# Patient Record
Sex: MALE | Race: WHITE | NOT HISPANIC OR LATINO | Employment: OTHER | URBAN - METROPOLITAN AREA
[De-identification: names, ages, dates, MRNs, and addresses within clinical notes are randomized per-mention and may not be internally consistent; named-entity substitution may affect disease eponyms.]

---

## 2017-01-10 ENCOUNTER — TELEPHONE (OUTPATIENT)
Dept: PULMONOLOGY | Facility: HOSPICE | Age: 67
End: 2017-01-10

## 2017-01-10 DIAGNOSIS — G47.33 OBSTRUCTIVE SLEEP APNEA: ICD-10-CM

## 2018-02-12 ENCOUNTER — HOSPITAL ENCOUNTER (EMERGENCY)
Facility: MEDICAL CENTER | Age: 68
End: 2018-02-12
Attending: EMERGENCY MEDICINE
Payer: MEDICARE

## 2018-02-12 VITALS
OXYGEN SATURATION: 95 % | BODY MASS INDEX: 37.95 KG/M2 | TEMPERATURE: 98.8 F | SYSTOLIC BLOOD PRESSURE: 146 MMHG | HEART RATE: 70 BPM | WEIGHT: 280.2 LBS | RESPIRATION RATE: 18 BRPM | HEIGHT: 72 IN | DIASTOLIC BLOOD PRESSURE: 86 MMHG

## 2018-02-12 DIAGNOSIS — B02.9 HERPES ZOSTER WITHOUT COMPLICATION: ICD-10-CM

## 2018-02-12 LAB — EKG IMPRESSION: NORMAL

## 2018-02-12 PROCEDURE — 93005 ELECTROCARDIOGRAM TRACING: CPT

## 2018-02-12 PROCEDURE — 93005 ELECTROCARDIOGRAM TRACING: CPT | Performed by: EMERGENCY MEDICINE

## 2018-02-12 PROCEDURE — 99284 EMERGENCY DEPT VISIT MOD MDM: CPT

## 2018-02-12 RX ORDER — HYDROCODONE BITARTRATE AND ACETAMINOPHEN 5; 325 MG/1; MG/1
1-2 TABLET ORAL EVERY 4 HOURS PRN
Qty: 15 TAB | Refills: 0 | Status: SHIPPED | OUTPATIENT
Start: 2018-02-12 | End: 2018-02-17

## 2018-02-12 RX ORDER — VALACYCLOVIR HYDROCHLORIDE 1 G/1
1000 TABLET, FILM COATED ORAL 2 TIMES DAILY
Qty: 14 TAB | Refills: 0 | Status: SHIPPED | OUTPATIENT
Start: 2018-02-12 | End: 2018-02-19

## 2018-02-12 RX ORDER — PREDNISONE 20 MG/1
20 TABLET ORAL 2 TIMES DAILY
Qty: 10 TAB | Refills: 0 | Status: SHIPPED | OUTPATIENT
Start: 2018-02-12

## 2018-02-12 ASSESSMENT — PAIN SCALES - GENERAL
PAINLEVEL_OUTOF10: 10
PAINLEVEL_OUTOF10: 5

## 2018-02-12 ASSESSMENT — LIFESTYLE VARIABLES: DO YOU DRINK ALCOHOL: NO

## 2018-02-12 NOTE — DISCHARGE INSTRUCTIONS
Shingles  Shingles, which is also known as herpes zoster, is an infection that causes a painful skin rash and fluid-filled blisters. Shingles is not related to genital herpes, which is a sexually transmitted infection.     Shingles only develops in people who:  · Have had chickenpox.  · Have received the chickenpox vaccine. (This is rare.)  CAUSES  Shingles is caused by varicella-zoster virus (VZV). This is the same virus that causes chickenpox. After exposure to VZV, the virus stays in the body in an inactive (dormant) state. Shingles develops if the virus reactivates. This can happen many years after the initial exposure to VZV. It is not known what causes this virus to reactivate.  RISK FACTORS  People who have had chickenpox or received the chickenpox vaccine are at risk for shingles. Infection is more common in people who:  · Are older than age 50.  · Have a weakened defense (immune) system, such as those with HIV, AIDS, or cancer.  · Are taking medicines that weaken the immune system, such as transplant medicines.  · Are under great stress.  SYMPTOMS  Early symptoms of this condition include itching, tingling, and pain in an area on your skin. Pain may be described as burning, stabbing, or throbbing.  A few days or weeks after symptoms start, a painful red rash appears, usually on one side of the body in a bandlike or beltlike pattern. The rash eventually turns into fluid-filled blisters that break open, scab over, and dry up in about 2-3 weeks.  At any time during the infection, you may also develop:  · A fever.  · Chills.  · A headache.  · An upset stomach.  DIAGNOSIS  This condition is diagnosed with a skin exam. Sometimes, skin or fluid samples are taken from the blisters before a diagnosis is made. These samples are examined under a microscope or sent to a lab for testing.  TREATMENT  There is no specific cure for this condition. Your health care provider will probably prescribe medicines to help you  manage pain, recover more quickly, and avoid long-term problems. Medicines may include:  · Antiviral drugs.  · Anti-inflammatory drugs.  · Pain medicines.  If the area involved is on your face, you may be referred to a specialist, such as an eye doctor (ophthalmologist) or an ear, nose, and throat (ENT) doctor to help you avoid eye problems, chronic pain, or disability.  HOME CARE INSTRUCTIONS  Medicines  · Take medicines only as directed by your health care provider.  · Apply an anti-itch or numbing cream to the affected area as directed by your health care provider.  Blister and Rash Care  · Take a cool bath or apply cool compresses to the area of the rash or blisters as directed by your health care provider. This may help with pain and itching.  · Keep your rash covered with a loose bandage (dressing). Wear loose-fitting clothing to help ease the pain of material rubbing against the rash.  · Keep your rash and blisters clean with mild soap and cool water or as directed by your health care provider.  · Check your rash every day for signs of infection. These include redness, swelling, and pain that lasts or increases.  · Do not pick your blisters.  · Do not scratch your rash.  General Instructions  · Rest as directed by your health care provider.  · Keep all follow-up visits as directed by your health care provider. This is important.  · Until your blisters scab over, your infection can cause chickenpox in people who have never had it or been vaccinated against it. To prevent this from happening, avoid contact with other people, especially:  ¨ Babies.  ¨ Pregnant women.  ¨ Children who have eczema.  ¨ Elderly people who have transplants.  ¨ People who have chronic illnesses, such as leukemia or AIDS.  SEEK MEDICAL CARE IF:  · Your pain is not relieved with prescribed medicines.  · Your pain does not get better after the rash heals.  · Your rash looks infected. Signs of infection include redness, swelling, and pain  that lasts or increases.  SEEK IMMEDIATE MEDICAL CARE IF:  · The rash is on your face or nose.  · You have facial pain, pain around your eye area, or loss of feeling on one side of your face.  · You have ear pain or you have ringing in your ear.  · You have loss of taste.  · Your condition gets worse.     This information is not intended to replace advice given to you by your health care provider. Make sure you discuss any questions you have with your health care provider.     Document Released: 12/18/2006 Document Revised: 01/08/2016 Document Reviewed: 10/29/2015  [employer] Patient Education ©2016 [employer]

## 2018-02-12 NOTE — ED TRIAGE NOTES
Pt ambulated to triage with   Chief Complaint   Patient presents with   • Sent by MD     thought he was having allergic reaction to fluoxetine, with shooting pain to head and rash to face, and trouble breathing, congested.  - told by pharmacist to not take that medication any more, called PCP and told him to come to the ER.       Pt stopped taking medication, last dose Saturday morning.  Pt reports chest pressure, called for EKG.  Pt reports son works on [employer].  Pt Informed regarding triage process and verbalized understanding to inform triage tech or RN for any changes in condition. Placed in lobby.

## 2018-02-12 NOTE — ED PROVIDER NOTES
"ED Provider Note    Scribed for Caleb Marquez M.D. by Carolann Harper. 2/12/2018  2:03 PM    Primary care provider: Caleb Ortiz M.D.  Means of arrival: Walk-in  History obtained from: Patient  History limited by: None    CHIEF COMPLAINT  Chief Complaint   Patient presents with   • Sent by MD     thought he was having allergic reaction to fluoxetine, with shooting pain to head and rash to face, and trouble breathing, congested.  - told by pharmacist to not take that medication any more, called PCP and told him to come to the ER.       HPI  Ryan Rodgers is a 67 y.o. male who presents to the Emergency Department for rash onset two weeks ago. Patient was recently placed on Prozac by his primary care physician and he began developing symptoms he suspected are secondary to an allergic reaction to the new medication. He last took a dose of Prozac two days ago. Patient complains of a \"burning\" rash which presents to the top of his head, scalp and right side of his face. Other symptoms include intermittent headaches and inability to sleep. Patient states he consulted with the pharmacist who filled his prescription and the pharmacist agreed his symptoms may be due to a possible allergic reaction and told him to contact his physician. Patient then contacted his primary care provider who advised him to come to the ED. Patient denies recent fever.     Patient also complained of chest \"pressure\" upon arrival but states this has currently resolved.    REVIEW OF SYSTEMS  Pertinent positives include rash, headaches, inability to sleep. Pertinent negatives include no fever. As above, all other systems reviewed and are negative.  See HPI for further details.   C.    PAST MEDICAL HISTORY   has a past medical history of Central sleep apnea (1/30/2012); Chickenpox; Dyslipidemia; Hepatitis C (10/28/2010); Hepatitis C; HTN (hypertension) (10/28/2010); Hyperlipidemia (10/28/2010); Impaired fasting blood sugar (10/28/2010); " Renal insufficiency (10/28/2010); and Sleep apnea.    SURGICAL HISTORY   has a past surgical history that includes laminotomy; elbow arthroscopy; and cervical fusion posterior.    SOCIAL HISTORY  Social History   Substance Use Topics   • Smoking status: Former Smoker     Packs/day: 0.50     Years: 30.00     Types: Cigarettes     Quit date: 1/1/2000   • Smokeless tobacco: Never Used      Comment: quit 1998   • Alcohol use No      History   Drug Use No       FAMILY HISTORY  Family History   Problem Relation Age of Onset   • Cancer Mother    • Sleep Apnea Neg Hx        CURRENT MEDICATIONS    Current Outpatient Prescriptions on File Prior to Encounter   Medication Sig Dispense Refill   • levothyroxine (SYNTHROID) 50 MCG Tab      • nabumetone (RELAFEN) 750 MG Tab      • GAVILYTE-N WITH FLAVOR PACK 420 G solution      • pravastatin (PRAVACHOL) 20 MG Tab      • hydrochlorothiazide (HYDRODIURIL) 25 MG TABS Take 1 Tab by mouth every day. 30 Each 3   • citalopram (CELEXA) 20 MG TABS Take 1 Tab by mouth every day. 30 Each 3   • losartan (COZAAR) 100 MG TABS Take 1 Tab by mouth every day. 30 Each 3   • amlodipine (NORVASC) 5 MG TABS Take 1 Tab by mouth every day. 30 Each 3   • zolpidem (AMBIEN) 5 MG TABS Take 5 mg by mouth at bedtime as needed.     • albuterol (VENTOLIN OR PROVENTIL) 108 (90 BASE) MCG/ACT AERS Inhale 2 Puffs by mouth every 6 hours as needed for Shortness of Breath. 1 Inhaler 2    Prozac.     ALLERGIES  Allergies   Allergen Reactions   • Celebrex [Celecoxib]    • Vioxx    • Zestril [Lisinopril]      Rash         PHYSICAL EXAM  VITAL SIGNS: /90   Pulse 82   Temp 37.1 °C (98.8 °F) (Temporal)   Resp 16   Ht 1.829 m (6')   Wt (!) 127.1 kg (280 lb 3.3 oz)   SpO2 95%   BMI 38.00 kg/m²     Constitutional: Well developed, Well nourished,no acute distress, Non-toxic appearance.   HENT: Normocephalic, Atraumatic. Oropharynx moist. See skin exam below.  Eyes: PERRL, EOMI, Conjunctiva normal, No discharge. No  apparent involvement  Neck: no anterior cervical lymphadenopathy  CV: Good pulses  Thorax & Lungs: No respiratory distress.   Abdomen:  Soft, non-tender.  No rebound, no peritoneal signs.   Skin: Redness to right side of forehead and right cheek. One vesicular patch on right side of forehead consistent with Shingles. Warm, Dry.   Musculoskeletal: No major deformities noted.   Neurologic: Awake, alert. Moves all extremities spontaneously.  Psychiatric: Affect normal, Mood normal.     LABS  Results for orders placed or performed during the hospital encounter of 18   EKG (ER)   Result Value Ref Range    Report       [facility] Emergency Dept.    Test Date:  2018  Pt Name:    SABAS WEINBERG           Department: ER  MRN:        [id number]                      Room:  Gender:     Male                         Technician: [id number]  :        1950                   Requested By:ER TRIAGE PROTOCOL  Order #:    [id number]                    Reading MD: MELLISSA DE LA VEGA MD    Measurements  Intervals                                Axis  Rate:       72                           P:          25  OH:         184                          QRS:        30  QRSD:       98                           T:          8  QT:         420  QTc:        460    Interpretive Statements  SINUS RHYTHM  No previous ECG available for comparison    Electronically Signed On 2018 14:16:30 PST by MELLISSA DE LA VEGA MD        EKG  Interpreted by me. See labs above.     RADIOLOGY  None.    COURSE & MEDICAL DECISION MAKING  Nursing notes, VS, PMSFHx reviewed in chart.    2:03 PM - Patient seen and examined at bedside. I informed the patient his symptoms and presentation are consistent with Shingles. I also informed the patient his symptoms may persist for 4-6 weeks. I advised the patient to monitor his symptoms for any changes and told him in some rare cases, Shingles may present longer that 4-6 weeks. We discussed follow-up  with his primary care physician. I explained the discharge medications I will prescribe with the patient. He was agreeable and had the opportunity for questions.     2:18 PM Interpreted EKG. See labs above.      The patient will return for new or worsening symptoms and is stable at the time of discharge.    In prescribing controlled substances to this patient, I certify that I have obtained and reviewed the medical history of Ryan Rodgers. I have also made a good raulito effort to obtain applicable records from other providers who have treated the patient and reviewed BNP.     I have conducted a physical exam and documented it. I have reviewed Mr. Rodgers’s prescription history as maintained by the [place] Prescription Monitoring Program.     I have assessed the patient’s risk for abuse, dependency, and addiction using the validated Opioid Risk Tool available at https://www.mdcRewardMyWay.com/ctbomz-qhto-nqat-ort-narcotic-abuse.     Given the above, I believe the benefits of controlled substance therapy outweigh the risks. The reasons for prescribing controlled substances include  neuropathic pain from shingles Accordingly, I have discussed the risk and benefits, treatment plan, and alternative therapies with the patient.        DISPOSITION:  Patient will be discharged home in stable condition.    FOLLOW UP:  Primary Care Provider.     OUTPATIENT MEDICATIONS:  New Prescriptions    HYDROCODONE-ACETAMINOPHEN (NORCO) 5-325 MG TAB PER TABLET    Take 1-2 Tabs by mouth every four hours as needed for up to 5 days.    PREDNISONE (DELTASONE) 20 MG TAB    Take 1 Tab by mouth 2 times a day.    VALACYCLOVIR (VALTREX) 1 GM TAB    Take 1 Tab by mouth 2 times a day for 7 days.       FINAL IMPRESSION  1. Herpes zoster without complication          ICarolann (Che), am scribing for, and in the presence of, Caleb Marquez M.D..    Electronically signed by: Carolann Harper (Che), 2/12/2018    Caleb PARIKH M.D.  personally performed the services described in this documentation, as scribed by Carolann Harper in my presence, and it is both accurate and complete.    The note accurately reflects work and decisions made by me.  Caleb Marquez  2/12/2018  2:55 PM

## 2018-02-12 NOTE — ED NOTES
Discharge instructions given, pt verbalized understanding.  Understands NOT to drive or drink alcohol while taking narcotics.  Prescription instructions given, pt verbalized understanding.  Pt signed narcotic form.  A&ox4.  VSS.  Ambulates out of ER.

## 2021-03-03 DIAGNOSIS — Z23 NEED FOR VACCINATION: ICD-10-CM

## 2023-03-31 LAB
ALBUMIN SERPL-MCNC: 4.6 G/DL (ref 3.8–4.8)
ALBUMIN/GLOB SERPL: 1.4 {RATIO} (ref 1.2–2.2)
ALP SERPL-CCNC: 50 IU/L (ref 44–121)
ALT SERPL-CCNC: 17 IU/L (ref 0–44)
AST SERPL-CCNC: 19 IU/L (ref 0–40)
BASOPHILS # BLD AUTO: 0.1 X10E3/UL (ref 0–0.2)
BASOPHILS NFR BLD AUTO: 1 %
BILIRUB SERPL-MCNC: 0.3 MG/DL (ref 0–1.2)
BUN SERPL-MCNC: 23 MG/DL (ref 8–27)
BUN/CREAT SERPL: 26 (ref 10–24)
CALCIUM SERPL-MCNC: 9.7 MG/DL (ref 8.6–10.2)
CHLORIDE SERPL-SCNC: 105 MMOL/L (ref 96–106)
CO2 SERPL-SCNC: 21 MMOL/L (ref 20–29)
CREAT SERPL-MCNC: 0.87 MG/DL (ref 0.76–1.27)
EGFRCR SERPLBLD CKD-EPI 2021: 93 ML/MIN/1.73
EOSINOPHIL # BLD AUTO: 0.3 X10E3/UL (ref 0–0.4)
EOSINOPHIL NFR BLD AUTO: 3 %
ERYTHROCYTE [DISTWIDTH] IN BLOOD BY AUTOMATED COUNT: 14.5 % (ref 11.6–15.4)
GLOBULIN SER CALC-MCNC: 3.4 G/DL (ref 1.5–4.5)
GLUCOSE SERPL-MCNC: 113 MG/DL (ref 70–99)
HCT VFR BLD AUTO: 47.6 % (ref 37.5–51)
HCV IGG SERPL QL IA: REACTIVE
HGB BLD-MCNC: 15.4 G/DL (ref 13–17.7)
IMM GRANULOCYTES # BLD AUTO: 0.1 X10E3/UL (ref 0–0.1)
IMM GRANULOCYTES NFR BLD AUTO: 1 %
IMMATURE CELLS  115398: ABNORMAL
LYMPHOCYTES # BLD AUTO: 3.9 X10E3/UL (ref 0.7–3.1)
LYMPHOCYTES NFR BLD AUTO: 33 %
MCH RBC QN AUTO: 28.2 PG (ref 26.6–33)
MCHC RBC AUTO-ENTMCNC: 32.4 G/DL (ref 31.5–35.7)
MCV RBC AUTO: 87 FL (ref 79–97)
MONOCYTES # BLD AUTO: 0.6 X10E3/UL (ref 0.1–0.9)
MONOCYTES NFR BLD AUTO: 5 %
MORPHOLOGY BLD-IMP: ABNORMAL
NEUTROPHILS # BLD AUTO: 7 X10E3/UL (ref 1.4–7)
NEUTROPHILS NFR BLD AUTO: 57 %
NRBC BLD AUTO-RTO: ABNORMAL %
PLATELET # BLD AUTO: 291 X10E3/UL (ref 150–450)
POTASSIUM SERPL-SCNC: 4.5 MMOL/L (ref 3.5–5.2)
PROT SERPL-MCNC: 8 G/DL (ref 6–8.5)
RBC # BLD AUTO: 5.47 X10E6/UL (ref 4.14–5.8)
SODIUM SERPL-SCNC: 141 MMOL/L (ref 134–144)
WBC # BLD AUTO: 12 X10E3/UL (ref 3.4–10.8)